# Patient Record
Sex: FEMALE | URBAN - METROPOLITAN AREA
[De-identification: names, ages, dates, MRNs, and addresses within clinical notes are randomized per-mention and may not be internally consistent; named-entity substitution may affect disease eponyms.]

---

## 2018-07-25 ENCOUNTER — HOSPITAL ENCOUNTER (OUTPATIENT)
Dept: LAB | Age: 83
Discharge: HOME | End: 2018-07-25

## 2018-07-25 LAB
APPEARANCE UR: CLEAR
BACTERIA URNS QL MICRO: 0 /HPF
BILIRUB UR QL: NEGATIVE
CASTS URNS QL MICRO: ABNORMAL /LPF
COLOR UR: YELLOW
EPI CELLS #/AREA URNS HPF: ABNORMAL /HPF
GLUCOSE UR STRIP.AUTO-MCNC: NEGATIVE MG/DL
HGB UR QL STRIP: NEGATIVE
KETONES UR QL STRIP.AUTO: NEGATIVE MG/DL
LEUKOCYTE ESTERASE UR QL STRIP.AUTO: ABNORMAL
NITRITE UR QL STRIP.AUTO: NEGATIVE
PH UR STRIP: 6 [PH] (ref 5–9)
PROT UR STRIP-MCNC: 30 MG/DL
RBC #/AREA URNS HPF: 0 /HPF
SP GR UR REFRACTOMETRY: 1.02 (ref 1–1.02)
UROBILINOGEN UR QL STRIP.AUTO: 1 EU/DL (ref 0.2–1)
WBC URNS QL MICRO: ABNORMAL /HPF

## 2018-07-25 PROCEDURE — 81001 URINALYSIS AUTO W/SCOPE: CPT | Performed by: NURSE PRACTITIONER

## 2020-08-06 ENCOUNTER — HOSPICE ADMISSION (OUTPATIENT)
Dept: HOSPICE | Facility: HOSPICE | Age: 85
End: 2020-08-06
Payer: OTHER MISCELLANEOUS

## 2020-08-15 ENCOUNTER — HOSPITAL ENCOUNTER (INPATIENT)
Age: 85
LOS: 5 days | Discharge: HOME | End: 2020-08-20
Attending: INTERNAL MEDICINE | Admitting: INTERNAL MEDICINE

## 2020-08-15 PROBLEM — I10 HYPERTENSION: Status: ACTIVE | Noted: 2019-03-18

## 2020-08-15 PROBLEM — R13.19 ESOPHAGEAL DYSPHAGIA: Status: ACTIVE | Noted: 2020-03-18

## 2020-08-15 PROBLEM — R53.81 DEBILITY: Status: ACTIVE | Noted: 2019-05-09

## 2020-08-15 PROBLEM — K80.20 CHOLELITHIASIS: Status: ACTIVE | Noted: 2019-03-18

## 2020-08-15 PROBLEM — R33.9 RETENTION OF URINE: Status: ACTIVE | Noted: 2019-05-10

## 2020-08-15 PROBLEM — N18.30 CKD (CHRONIC KIDNEY DISEASE) STAGE 3, GFR 30-59 ML/MIN (HCC): Status: ACTIVE | Noted: 2018-10-18

## 2020-08-15 PROBLEM — R29.6 REPEATED FALLS: Status: ACTIVE | Noted: 2020-08-15

## 2020-08-15 PROBLEM — Z95.2 S/P TAVR (TRANSCATHETER AORTIC VALVE REPLACEMENT): Status: ACTIVE | Noted: 2019-05-15

## 2020-08-15 PROBLEM — E43 SEVERE PROTEIN-CALORIE MALNUTRITION (HCC): Status: ACTIVE | Noted: 2020-08-15

## 2020-08-15 PROBLEM — M81.0 SENILE OSTEOPOROSIS: Status: ACTIVE | Noted: 2019-02-05

## 2020-08-15 PROBLEM — R63.4 ABNORMAL WEIGHT LOSS: Status: ACTIVE | Noted: 2020-08-15

## 2020-08-15 PROBLEM — I25.10 ATHEROSCLEROSIS OF NATIVE CORONARY ARTERY WITHOUT ANGINA PECTORIS: Status: ACTIVE | Noted: 2020-08-15

## 2020-08-15 PROBLEM — I50.22 CHRONIC SYSTOLIC HEART FAILURE (HCC): Status: ACTIVE | Noted: 2019-04-12

## 2020-08-15 PROBLEM — I27.20 PULMONARY HTN (HCC): Status: ACTIVE | Noted: 2020-08-15

## 2020-08-15 PROBLEM — S52.502A CLOSED FRACTURE OF LEFT DISTAL RADIUS: Status: ACTIVE | Noted: 2019-11-08

## 2020-08-15 PROBLEM — S00.03XA: Status: ACTIVE | Noted: 2020-06-18

## 2020-08-15 PROBLEM — I35.0 NONRHEUMATIC AORTIC VALVE STENOSIS: Status: ACTIVE | Noted: 2019-03-18

## 2020-08-15 PROBLEM — R63.0 ANOREXIA: Status: ACTIVE | Noted: 2020-08-15

## 2020-08-15 PROBLEM — R11.0 NAUSEA: Status: ACTIVE | Noted: 2019-05-09

## 2020-08-15 PROBLEM — E55.9 VITAMIN D DEFICIENCY: Status: ACTIVE | Noted: 2018-09-06

## 2020-08-15 PROBLEM — I13.0 HYPERTENSIVE HEART AND CHRONIC KIDNEY DISEASE WITH HEART FAILURE (HCC): Status: ACTIVE | Noted: 2020-08-15

## 2020-08-15 PROBLEM — S06.5XAA SUBDURAL HEMATOMA: Status: ACTIVE | Noted: 2018-07-05

## 2020-08-15 PROBLEM — B37.81 CANDIDA ESOPHAGITIS (HCC): Status: ACTIVE | Noted: 2020-08-15

## 2020-08-15 PROBLEM — G89.29 CHRONIC PAIN: Status: ACTIVE | Noted: 2020-08-15

## 2020-08-15 PROBLEM — E43 PROTEIN-CALORIE MALNUTRITION, SEVERE (HCC): Status: ACTIVE | Noted: 2020-08-15

## 2020-08-15 PROBLEM — S32.509A FRACTURE OF PUBIS (HCC): Status: ACTIVE | Noted: 2018-07-05

## 2020-08-15 PROBLEM — S66.812A: Status: ACTIVE | Noted: 2019-12-03

## 2020-08-15 PROBLEM — D64.9 ANEMIA: Status: ACTIVE | Noted: 2018-09-06

## 2020-08-15 PROBLEM — N18.30 CKD (CHRONIC KIDNEY DISEASE) STAGE 3, GFR 30-59 ML/MIN (HCC): Status: ACTIVE | Noted: 2020-08-15

## 2020-08-15 PROBLEM — U07.1 COVID-19: Status: ACTIVE | Noted: 2020-06-23

## 2020-08-15 PROBLEM — K22.711 BARRETT'S ESOPHAGUS WITH HIGH GRADE DYSPLASIA: Status: ACTIVE | Noted: 2020-08-15

## 2020-08-15 PROCEDURE — 3336500001 HSPC ELECTION

## 2020-08-15 PROCEDURE — G0156 HHCP-SVS OF AIDE,EA 15 MIN: HCPCS

## 2020-08-15 PROCEDURE — 0655 HSPC INPATIENT RESPITE

## 2020-08-15 PROCEDURE — G0299 HHS/HOSPICE OF RN EA 15 MIN: HCPCS

## 2020-08-15 PROCEDURE — 74011250637 HC RX REV CODE- 250/637: Performed by: NURSE PRACTITIONER

## 2020-08-15 RX ORDER — CETIRIZINE HCL 10 MG
10 TABLET ORAL DAILY
Status: DISCONTINUED | OUTPATIENT
Start: 2020-08-16 | End: 2020-08-20 | Stop reason: HOSPADM

## 2020-08-15 RX ORDER — DOCUSATE SODIUM 100 MG/1
100 CAPSULE, LIQUID FILLED ORAL
COMMUNITY
Start: 2020-06-26

## 2020-08-15 RX ORDER — DOCUSATE SODIUM 100 MG/1
100 CAPSULE, LIQUID FILLED ORAL
Status: DISCONTINUED | OUTPATIENT
Start: 2020-08-15 | End: 2020-08-20 | Stop reason: HOSPADM

## 2020-08-15 RX ORDER — SUCRALFATE 1 G/1
1 TABLET ORAL 3 TIMES DAILY
Status: DISCONTINUED | OUTPATIENT
Start: 2020-08-15 | End: 2020-08-15

## 2020-08-15 RX ORDER — LANOLIN ALCOHOL/MO/W.PET/CERES
3 CREAM (GRAM) TOPICAL
COMMUNITY
Start: 2020-06-26

## 2020-08-15 RX ORDER — LANOLIN ALCOHOL/MO/W.PET/CERES
3 CREAM (GRAM) TOPICAL
Status: DISCONTINUED | OUTPATIENT
Start: 2020-08-15 | End: 2020-08-20 | Stop reason: HOSPADM

## 2020-08-15 RX ORDER — SUCRALFATE 1 G/1
1 TABLET ORAL 3 TIMES DAILY
COMMUNITY

## 2020-08-15 RX ORDER — CETIRIZINE HCL 10 MG
10 TABLET ORAL DAILY
COMMUNITY

## 2020-08-15 RX ORDER — ACETAMINOPHEN 500 MG/1
500 CAPSULE, LIQUID FILLED ORAL
Status: DISCONTINUED | OUTPATIENT
Start: 2020-08-15 | End: 2020-08-20 | Stop reason: HOSPADM

## 2020-08-15 RX ORDER — HYDROGEN PEROXIDE 3 %
20 SOLUTION, NON-ORAL MISCELLANEOUS 2 TIMES DAILY
COMMUNITY

## 2020-08-15 RX ORDER — HYDROCODONE BITARTRATE AND ACETAMINOPHEN 5; 325 MG/1; MG/1
1 TABLET ORAL
Status: DISCONTINUED | OUTPATIENT
Start: 2020-08-15 | End: 2020-08-20 | Stop reason: HOSPADM

## 2020-08-15 RX ORDER — SUCRALFATE 1 G/1
1 TABLET ORAL
Status: DISCONTINUED | OUTPATIENT
Start: 2020-08-16 | End: 2020-08-17

## 2020-08-15 RX ORDER — HYDROGEN PEROXIDE 3 %
20 SOLUTION, NON-ORAL MISCELLANEOUS 2 TIMES DAILY
Status: DISCONTINUED | OUTPATIENT
Start: 2020-08-15 | End: 2020-08-17

## 2020-08-15 RX ADMIN — Medication 3 MG: at 21:17

## 2020-08-15 RX ADMIN — HYDROCODONE BITARTRATE AND ACETAMINOPHEN 1 TABLET: 5; 325 TABLET ORAL at 21:17

## 2020-08-15 RX ADMIN — Medication 100 MG: at 21:17

## 2020-08-15 RX ADMIN — ESOMEPRAZOLE MAGNESIUM 20 MG: 20 CAPSULE, DELAYED RELEASE ORAL at 18:22

## 2020-08-15 NOTE — PROGRESS NOTES
Pt arrived via private car, transferred to room 106 via wheelchair, pt alert and oriented. Oriented her to room, use of call bell, temperature control, tab alerts and use of TV control. Pt answering questions appropriately. Physical assessment completed. Lung sounds clear but diminished, heart sounds regular, hypoactive bowel sounds, extremities cool with palpable pulses. Lower extremities flaky. Sacrum reddened unblanchable, heels reddened, and non blanchable. Pt appears dyspneic, states she's a little short of breath, states she has back pain but she did not bring tylenol po because she states it does not work. [phone] called [facility] RN about pain medication needs and order for eye drops. 1640 with stand by assist, pt ambulated into bathroom with use of walker. Pt voided and ambulated back to bed without problem     1702 [facility] RN Harris Ramirez at bedside. Discussed medication order needs. Medication orders faxed to [facility].     1745 pt was assisted up to bathroom but states she was unable to pull the cord, pt ambulated to bed. Assisted her back to bed.     1829 administered evening medications. Pt refused carafate states she needs to take it before meals. Asking about when she will be taking her sleeping pills. Discussed when she would like to take her sleep aides.     1848 received Norco 5/325 counted and locked in double locks with on coming nurse Nathaniel Tracey RN    Report given to Nathaniel Tracey RN

## 2020-08-15 NOTE — PROGRESS NOTES
Problem: Risk for Falls  Goal: Free of falls during episode of care  Description: Patient will be free of falls during episode of care.   Outcome: Progressing Towards Goal     Problem: Pain  Goal: Verbalize satisfaction of level of comfort and symptom control  Outcome: Progressing Towards Goal

## 2020-08-16 PROCEDURE — G0299 HHS/HOSPICE OF RN EA 15 MIN: HCPCS

## 2020-08-16 PROCEDURE — 0655 HSPC INPATIENT RESPITE

## 2020-08-16 PROCEDURE — 74011250637 HC RX REV CODE- 250/637: Performed by: NURSE PRACTITIONER

## 2020-08-16 PROCEDURE — G0156 HHCP-SVS OF AIDE,EA 15 MIN: HCPCS

## 2020-08-16 RX ORDER — HYDROCODONE BITARTRATE AND ACETAMINOPHEN 5; 325 MG/1; MG/1
1 TABLET ORAL
COMMUNITY

## 2020-08-16 RX ORDER — CARBOXYMETHYLCELLULOSE SODIUM 10 MG/ML
2 GEL OPHTHALMIC
Status: DISCONTINUED | OUTPATIENT
Start: 2020-08-16 | End: 2020-08-20 | Stop reason: HOSPADM

## 2020-08-16 RX ADMIN — Medication 3 MG: at 21:12

## 2020-08-16 RX ADMIN — HYDROCODONE BITARTRATE AND ACETAMINOPHEN 1 TABLET: 5; 325 TABLET ORAL at 08:54

## 2020-08-16 RX ADMIN — SUCRALFATE 1 G: 1 TABLET ORAL at 15:41

## 2020-08-16 RX ADMIN — SUCRALFATE 1 G: 1 TABLET ORAL at 10:51

## 2020-08-16 RX ADMIN — ESOMEPRAZOLE MAGNESIUM 20 MG: 20 CAPSULE, DELAYED RELEASE ORAL at 17:14

## 2020-08-16 RX ADMIN — HYDROCODONE BITARTRATE AND ACETAMINOPHEN 1 TABLET: 5; 325 TABLET ORAL at 18:12

## 2020-08-16 RX ADMIN — Medication 10 MG: at 08:27

## 2020-08-16 RX ADMIN — Medication 100 MG: at 21:12

## 2020-08-16 RX ADMIN — ESOMEPRAZOLE MAGNESIUM 20 MG: 20 CAPSULE, DELAYED RELEASE ORAL at 07:42

## 2020-08-16 NOTE — PROGRESS NOTES
184  Received report from [facility], RN. Pt Id by name and . 1907  Pt awake lying in bed watching TV. A&O x 3. Denies pain at this time. Flacc =0-1. Resp non labored on RA. Lungs diminished. HR reg. BS hypo. Pt up to BR with assist. SR up x 2. Bed low/locked. Call light with in reach. Door opened. 211  Pt awake. Assisted pt up to BR. Pt voided. Assisted back to bed. Pt denies pain at this time. Flacc =0-1. Scheduled PO medications  given. Tolerated well. Resp non labored on RA. SR up x 2. Bed low/locked. Call light with in reach. Door opened. 2300  Pt resting comfortably in bed. No s/sx of distress. Flacc =0-1. Resp non labored on RA. SR up x 2. Bed low/locked. Call light with in reach. Door opened. 0140  Pt awake. C/O back pain 7/10. Hydrocodone 5/325 mg PO given. Pt repositioned in bed by CNA after returning back from BR. Resp non labored on RA. SR up x 2. Bed low/locked. Call light with in reach. Door opened. 0300   Pt sleeping. No s/sx of distress. Flacc =0-1. Resp non labored on RA. SR up x 2. Bed low/locked. Call light with in reach. Door opened. 0615  Pt sleeping. No s/sx of distress. Flacc =0-1. Resp non labored on RA. SR up x 2. Bed low/locked. Call light with in reach. Door opened. Report given to Joselyn Campo RN.

## 2020-08-16 NOTE — PROGRESS NOTES
Report received from off-going nurse,Jo Ghosh RN  visual identification made, assumed care of pt. Pt resting quietly with eyes closed, no agitation or restlessness, no grimacing or groaning. Pt respirations unlabored. Tab alert in place, rails up x 2, bed in lowest position, safety maintained. FLACC 0.    9509 assisted up to bathroom, pt ambulated with use of walker. Pt states she needs to take carafate an hour before meals so she refused it this morning, set pt up sitting in chair for breakfast, administered nexium per pt request.     0825 pt ate 50% of her breakfast, took her cetirizine without difficulty. Assisted to bathroom with pt using walker with a steady gait. Pt voided clear yellow urine    0850 assisted pt to bed with use of walker, pt states her back pain is 8/10    0857 administered norco for back pain 8/10. Call bell in reach, as well as items on side table. 0930 pt states she doesn't hurt as long as she is still. 1055 administered scheduled carafate, so it is administered an hour before her lunch. 1118 assisted pt up to bathroom. Pt ambulated with use of walker, states she had a bowel movement. Ambulated to the bench and placed doughnut for her to sit on and pillows behind her back. Gave her tab alert to pull when she needs assistance    1224 pt ate 75% of her dinner, denies any needs at this time    1242 assisted pt to her bed,     1356 pt resting quietly, no grimacing, groaning or agitation     1534 pt resting quietly, no grimacing, groaning or agitation. Watching TV    1549 administered carafate before evening meal. Pt states she normally takes nexium with carafate. She states she eats better without the carafate    1635 assisted pt up to bathroom, pt ambulated with use of walker, voided and states she had a bowel movement. 1700 assisted pt out of bed and ambulated to bench seat and set up her dinner to eat    1814 assisted pt to bathroom, she ambulated using the walker.  Pt voided. Assisted pt back to bed. She stated her tailbone pain is 8/10.  Administered [place] for pain     Report given to Santana Anaya RN

## 2020-08-16 NOTE — PROGRESS NOTES
Walking rounds completed with off going RN. Pt identified by name/. Watching TV. Smiling and social on approach. Denies needs at this time. Denies pain. Bed in low and locked position with side rails up x 2 with call light in reach. Door left open as pt is unaccompanied.  Assisted to bathroom with cna. Tolerated well with use of walker. Voiding without difficulty. C/o lower back pain 8/10 upon returning to bed. Reports chronic pain to back. Medicated with Lortab 5/325mg one tablet as ordered. Tolerated HS medications whole with no problems. Emotional support provided. Bed in low and locked position with side rails up x 2 and call light in reach. Door left open as pt is unaccompanied. 2347 Lauzon Tyndall soundly with eyes closed. Respirations non labored. Flacc 0. All safety measures continue. 0030 Continues to sleep peacefully with eyes closed. Respirations easy and unlabored. Facial features flat,relaxed. No s/sx of pain,anxiety,nausea or distress. All safety measures continue. Door remains open. 0226 Awakened to toilet. CNA assits to bathroom to void. Requires standby assist only. Gait is steady with use of walker. Denies any pain,anxiety,nausea at this time. Returned to bed and agrees to call for further needs. 1235  Sleeping soundly with eyes closed. Respirations non labored. Flacc 0. All safety measures continue. Pt has rested well this night.      Report to [facility]

## 2020-08-17 PROCEDURE — 74011250637 HC RX REV CODE- 250/637: Performed by: NURSE PRACTITIONER

## 2020-08-17 PROCEDURE — G0299 HHS/HOSPICE OF RN EA 15 MIN: HCPCS

## 2020-08-17 PROCEDURE — 0655 HSPC INPATIENT RESPITE

## 2020-08-17 RX ORDER — HYDROGEN PEROXIDE 3 %
20 SOLUTION, NON-ORAL MISCELLANEOUS 2 TIMES DAILY
Status: DISCONTINUED | OUTPATIENT
Start: 2020-08-18 | End: 2020-08-20 | Stop reason: HOSPADM

## 2020-08-17 RX ORDER — SUCRALFATE 1 G/1
1 TABLET ORAL
Status: DISCONTINUED | OUTPATIENT
Start: 2020-08-17 | End: 2020-08-20 | Stop reason: HOSPADM

## 2020-08-17 RX ADMIN — SUCRALFATE 1 G: 1 TABLET ORAL at 07:15

## 2020-08-17 RX ADMIN — HYDROCODONE BITARTRATE AND ACETAMINOPHEN 1 TABLET: 5; 325 TABLET ORAL at 01:54

## 2020-08-17 RX ADMIN — HYDROCODONE BITARTRATE AND ACETAMINOPHEN 1 TABLET: 5; 325 TABLET ORAL at 19:21

## 2020-08-17 RX ADMIN — HYDROCODONE BITARTRATE AND ACETAMINOPHEN 1 TABLET: 5; 325 TABLET ORAL at 12:59

## 2020-08-17 RX ADMIN — Medication 10 MG: at 08:31

## 2020-08-17 RX ADMIN — SUCRALFATE 1 G: 1 TABLET ORAL at 16:06

## 2020-08-17 RX ADMIN — Medication 3 MG: at 21:43

## 2020-08-17 RX ADMIN — Medication 100 MG: at 21:42

## 2020-08-17 RX ADMIN — ESOMEPRAZOLE MAGNESIUM 20 MG: 20 CAPSULE, DELAYED RELEASE ORAL at 08:31

## 2020-08-17 NOTE — PROGRESS NOTES
Problem: Potential for Skin Breakdown  Goal: Demonstrate ability to care for skin, monitor areas of breakdown and demonstrate methods to prevent breakdown  Description: Patient/family/caregiver will demonstrate ability to care for patient's skin, monitor for areas of breakdown, and demonstrate methods to prevent breakdown during hospice care. Outcome: Progressing Towards Goal  Goal: *Absence of skin breakdown  Description: Instruct pt to shift weight, use pillows to off set weight on sacrum. Apply moisture barrier cream   Outcome: Progressing Towards Goal     Problem: Risk for Falls  Goal: Free of falls during episode of care  Description: Patient will be free of falls during episode of care. Outcome: Progressing Towards Goal     Problem: Pain  Goal: Verbalize satisfaction of level of comfort and symptom control  Outcome: Progressing Towards Goal     Problem: Falls - Risk of  Goal: *Absence of Falls  Description: Document Hanna Fall Risk and appropriate interventions in the flowsheet.   Outcome: Progressing Towards Goal  Note: Fall Risk Interventions:  Mobility Interventions: Bed/chair exit alarm, Communicate number of staff needed for ambulation/transfer, Patient to call before getting OOB, Utilize walker, cane, or other assistive device         Medication Interventions: Bed/chair exit alarm, Evaluate medications/consider consulting pharmacy    Elimination Interventions: Bed/chair exit alarm, Call light in reach, Patient to call for help with toileting needs

## 2020-08-17 NOTE — PROGRESS NOTES
0102- The patient report and walking rounds completed with Trish Kohli RN. The patient is awake and alert to person, place and time. The patient identified self by name and . Patient states that she is not in pain, not SOB, has no nausea and is not anxious. She is in the bathroom with the CNA.     -  patient requested, \"the pill that I take an hour before eating. \" Sucralfate given broken in half. The patient states that she always waits and hour before she eats. 0730- The patient called and the CNA assisted her to sit in a chair at the bedside to eat breakfast. The patient has the call light within her reach and calls with her needs. 0830- AM scheduled medications administered whole as ordered. 1248- The patient complained of pain in the coccyx area of 8 on a 0/10 scale. The patient was given Norco 5-325 po for pain. The patient is in the bed now and remains alert and oriented times 3.     1313- The patient states that the pain is now 0.    1600- The patient took her Carafate and requested the Nexxium to be given at the same time. JOLENE Julian will change the order for her. 1800- The patient remains alert and oriented times three and calls with her needs. Report given to Stella Carpenter RN.

## 2020-08-17 NOTE — PROGRESS NOTES
Problem: Potential for Skin Breakdown  Goal: Demonstrate ability to care for skin, monitor areas of breakdown and demonstrate methods to prevent breakdown  Description: Patient/family/caregiver will demonstrate ability to care for patient's skin, monitor for areas of breakdown, and demonstrate methods to prevent breakdown during hospice care. Outcome: Progressing Towards Goal     Problem: Risk for Falls  Goal: Free of falls during episode of care  Description: Patient will be free of falls during episode of care. Outcome: Progressing Towards Goal     Problem: Pain  Goal: Verbalize satisfaction of level of comfort and symptom control  Outcome: Progressing Towards Goal     Problem: Falls - Risk of  Goal: *Absence of Falls  Description: Document Hanna Fall Risk and appropriate interventions in the flowsheet.   Outcome: Progressing Towards Goal  Note: Fall Risk Interventions:  Mobility Interventions: Bed/chair exit alarm         Medication Interventions: Bed/chair exit alarm    Elimination Interventions: Bed/chair exit alarm, Call light in reach

## 2020-08-17 NOTE — [facility] IDG CHAPLAIN NOTES
Patient: Maddy Carmona    Date: 08/17/20  Time: 9:29 AM    [facility]  Notes    Maddy Carmona is an [facility] her for Respite Care. Her  will provide spiritual care. [facility] available for support as needed, referred or requested.          Signed by: Zuly Huber 49

## 2020-08-18 PROCEDURE — 74011250637 HC RX REV CODE- 250/637: Performed by: NURSE PRACTITIONER

## 2020-08-18 PROCEDURE — 0655 HSPC INPATIENT RESPITE

## 2020-08-18 PROCEDURE — G0156 HHCP-SVS OF AIDE,EA 15 MIN: HCPCS

## 2020-08-18 RX ADMIN — SUCRALFATE 1 G: 1 TABLET ORAL at 16:14

## 2020-08-18 RX ADMIN — ESOMEPRAZOLE MAGNESIUM 20 MG: 20 CAPSULE, DELAYED RELEASE ORAL at 05:47

## 2020-08-18 RX ADMIN — HYDROCODONE BITARTRATE AND ACETAMINOPHEN 1 TABLET: 5; 325 TABLET ORAL at 08:54

## 2020-08-18 RX ADMIN — Medication 3 MG: at 21:18

## 2020-08-18 RX ADMIN — HYDROCODONE BITARTRATE AND ACETAMINOPHEN 1 TABLET: 5; 325 TABLET ORAL at 15:04

## 2020-08-18 RX ADMIN — HYDROCODONE BITARTRATE AND ACETAMINOPHEN 1 TABLET: 5; 325 TABLET ORAL at 02:54

## 2020-08-18 RX ADMIN — ESOMEPRAZOLE MAGNESIUM 20 MG: 20 CAPSULE, DELAYED RELEASE ORAL at 16:14

## 2020-08-18 RX ADMIN — SUCRALFATE 1 G: 1 TABLET ORAL at 05:47

## 2020-08-18 RX ADMIN — HYDROCODONE BITARTRATE AND ACETAMINOPHEN 1 TABLET: 5; 325 TABLET ORAL at 21:18

## 2020-08-18 RX ADMIN — SUCRALFATE 1 G: 1 TABLET ORAL at 11:09

## 2020-08-18 RX ADMIN — Medication 100 MG: at 21:18

## 2020-08-18 RX ADMIN — Medication 10 MG: at 08:54

## 2020-08-18 NOTE — PROGRESS NOTES
5650- The patient shift change rounds and report taken from Jeaneth Butts RN. The patient was identified by name and . Patient is from [facility] in for a Respite stay. Patient is awake and states that she is not in pain, has no nausea and no anxiety or SOB. She is on room air. The bed remains low and locked with two bed rails up and a tab alert in place. The patient has the call light within her reach and she calls with her needs. The patient's room is directly across from the nurses station and the door to the room is left open for close observation. 3388- The patient is up in the chair at her bedside eating her breakfast. She states that her back did real good last night but is a 6 on the pain scale level now. Medicated with her Norco for pain and also her scheduled Zyrtec. Patient took the pills whole with water. Patient ate almost all of her breakfast and drank two cups of coffee. Patient is alert and oriented to person, place and time. 0930- Patient states that her back pain is now at 0. She is in the bed and has the call light within her reach. 1109- Patient was administered her lunchtime Carafate. Patient took with no problems. Patient remains alert and oriented times three and continues to deny any distress. 1330- The patient is now in the bed watching television and continues to deny any distress. 1505- Patient complains of back pain 5/10 aching around coccyx area. Medicated with [place] po from her home medications. Patient denies other distress. Patient had continent       1605- Scheduled Carafate and Nexium administered. Patient was assisted to the bathroom to void. Gait is steady with walker and stand by assist. Patient states that her back pain is now 0. Patient remains alert and oriented times 3. Patient was left sitting in a chair at the bedside. She has the call light within her reach. 1645- The patient request to sit on the bench seat.  She states that the chair is causing her legs to go to sleep. Assisted her to the bench seat and instructed her to call with her needs. Patient does not attempt to get up alone.      Report off to Haylie Kate RN

## 2020-08-18 NOTE — PROGRESS NOTES
184:  Patient report from Rody Cao RN. Patient ID by name and . Pt in bed watching TV. Denies pain or any needs. FLACC 0/10. Bed low and locked and all safety measures in place. Door open. :  Patient called for pain pill. States she is supposed to get one at 7 PM.  Asked pt if she was in pain and she stated yes. Rated 5/10. Medicated with PRN [place] as ordered. Will reassess. :  Scheduled HS medications given at this time. States pain is better. 5684:  Assist pt to bathroom. 12:  CNA assisted pt to bathroom. Pt requesting pain pill. Rates pain 6/10. PRN Norco given as ordered. Will reassess. 0400:  Reassessment:  Pt resting with eyes closed. No s/sx of pain observed. FLACC 0/10.    0547:  Scheduled AM medications given as ordered. Patient has required Norco x 2 for pain this shift.     Report to Rody Cao RN

## 2020-08-18 NOTE — PROGRESS NOTES
Problem: Potential for Skin Breakdown  Goal: Demonstrate ability to care for skin, monitor areas of breakdown and demonstrate methods to prevent breakdown  Description: Patient/family/caregiver will demonstrate ability to care for patient's skin, monitor for areas of breakdown, and demonstrate methods to prevent breakdown during hospice care. Outcome: Progressing Towards Goal     Problem: Pain  Goal: Verbalize satisfaction of level of comfort and symptom control  Outcome: Progressing Towards Goal     Problem: Falls - Risk of  Goal: *Absence of Falls  Description: Document Hanna Fall Risk and appropriate interventions in the flowsheet. Outcome: Progressing Towards Goal  Note: Fall Risk Interventions:  Mobility Interventions: Bed/chair exit alarm         Medication Interventions: Bed/chair exit alarm    Elimination Interventions: Bed/chair exit alarm, Call light in reach              Problem: Pressure Injury - Risk of  Goal: *Prevention of pressure injury  Description: Document Luis Scale and appropriate interventions in the flowsheet.   Outcome: Progressing Towards Goal  Note: Pressure Injury Interventions:       Moisture Interventions: Minimize layers, Offer toileting Q_hr    Activity Interventions: Pressure redistribution bed/mattress(bed type)    Mobility Interventions: Pressure redistribution bed/mattress (bed type)    Nutrition Interventions: Document food/fluid/supplement intake    Friction and Shear Interventions: Apply protective barrier, creams and emollients, Minimize layers

## 2020-08-18 NOTE — PROGRESS NOTES
Problem: Potential for Skin Breakdown  Goal: Demonstrate ability to care for skin, monitor areas of breakdown and demonstrate methods to prevent breakdown  Description: Patient/family/caregiver will demonstrate ability to care for patient's skin, monitor for areas of breakdown, and demonstrate methods to prevent breakdown during hospice care. Outcome: Progressing Towards Goal     Problem: Risk for Falls  Goal: Free of falls during episode of care  Description: Patient will be free of falls during episode of care. Outcome: Progressing Towards Goal     Problem: Pain  Goal: Verbalize satisfaction of level of comfort and symptom control  Outcome: Progressing Towards Goal  Note: Patient was medicated with Norco times two this shift for back pain. Medications resolved the pain.

## 2020-08-18 NOTE — DISCHARGE SUMMARY
Discharge Summary     Patient: [facility] MRN: [id number]  SSN: [id number]    YOB: 1932  Age: 80 y.o.   Sex: female       Admit Date: 8/15/2020    Discharge Date: 8/20/2020 at 0830    Admission Diagnoses: Protein-calorie malnutrition, severe ([place] [address] [E43]    Discharge Diagnoses:   Problem List as of 8/18/2020 Date Reviewed: 8/15/2020          Codes Class Noted - Resolved    * (Principal) Severe protein-calorie malnutrition ([place] [address].) ICD-10-CM: E43  ICD-9-CM: 262  8/15/2020 - Present        Anorexia ICD-10-CM: R63.0  ICD-9-CM: 783.0  8/15/2020 - Present        Hernandez's esophagus with high grade dysplasia ICD-10-CM: K22.711  ICD-9-CM: 530.85  8/15/2020 - Present        Candida esophagitis ([place] [address].) ICD-10-CM: B37.81  ICD-9-CM: 112.84  8/15/2020 - Present        Atherosclerosis of native coronary artery without angina pectoris ICD-10-CM: I25.10  ICD-9-CM: 414.01  8/15/2020 - Present        Pulmonary HTN ([place] [address].) ICD-10-CM: I27.20  ICD-9-CM: 416.8  8/15/2020 - Present        Hypertensive heart and chronic kidney disease with heart failure ([place] [address].) ICD-10-CM: I13.0  ICD-9-CM: 404.91, 428.9, 585.9  8/15/2020 - Present        CKD (chronic kidney disease) stage 3, GFR 30-59 ml/min ([facility]) ICD-10-CM: N18.3  ICD-9-CM: 585.3  8/15/2020 - Present        Repeated falls ICD-10-CM: R29.6  ICD-9-CM: 781.99  8/15/2020 - Present        BMI less than 19,adult ICD-10-CM: Z68.1  ICD-9-CM: V85.0  8/15/2020 - Present        Abnormal weight loss ICD-10-CM: R63.4  ICD-9-CM: 783.21  8/15/2020 - Present        Chronic pain ICD-10-CM: G89.29  ICD-9-CM: 338.29  8/15/2020 - Present        Protein-calorie malnutrition, severe ([facility] ICD-10-CM: E43  ICD-9-CM: 262  8/15/2020 - Present        Esophageal dysphagia ICD-10-CM: R13.10  ICD-9-CM: 787.20  3/18/2020 - Present            Debility ICD-10-CM: R53.81  ICD-9-CM: 799.3  5/9/2019 - Present            Chronic systolic heart failure ([facility].) ICD-10-CM: I50.22  ICD-9-CM: 428.22  4/12/2019 - Present Hypertension ICD-10-CM: I10  ICD-9-CM: 401.9  3/18/2019 - Present        Senile osteoporosis ICD-10-CM: M81.0  ICD-9-CM: 733.01  2/5/2019 - Present        Anemia ICD-10-CM: D64.9  ICD-9-CM: 285.9  9/6/2018 - Present                   Discharge Condition: TEE Kevin Course: Discharge from respite stay at [facility] to home with [facility]. DC 8/20/2020 at 0830 via ambulance. Status at time of discharge is routine. Consults: None    Significant Diagnostic Studies: None    Disposition: home with [facility]    Discharge Medications:   Current Discharge Medication List      CONTINUE these medications which have NOT CHANGED    Details   HYDROcodone-acetaminophen (Norco) 5-325 mg per tablet Take 1 Tab by mouth every six (6) hours as needed for Pain. carboxymethylcellulose sodium (REFRESH OP) Administer 2 Drops to both eyes every four (4) hours as needed for Other (eye irritation). docusate sodium (COLACE) 100 mg capsule Take 100 mg by mouth nightly. melatonin 3 mg tablet Take 3 mg by mouth nightly. sucralfate (Carafate) 1 gram tablet Take 1 g by mouth three (3) times daily. cetirizine (ZYRTEC) 10 mg tablet Take 10 mg by mouth daily. esomeprazole (NEXIUM) 20 mg capsule Take 20 mg by mouth two (2) times a day. Activity: Activity as tolerated with assist  Diet: Resume previous diet  Wound Care: Stage 1 area to sacrum, apply barrier cream bid and prn. Oxygen: Room air  Equipment: Equipment as ordered by [facility]. Follow-up Appointments   Procedures    FOLLOW UP VISIT Appointment in: Other (Specify) Follow-up with home hospice per agency protocol. Follow-up with home hospice per agency protocol. Standing Status:   Standing     Number of Occurrences:   1     Order Specific Question:   Appointment in     Answer:    Other (Specify)       Signed By: Nata Gutierrez NP     August 18, 2020

## 2020-08-18 NOTE — PROGRESS NOTES
184:  Patient report from Mateo Castro RN. Patient ID by name and . Patient being helped back from bathroom with assist from [facility], [address] [place] [address]. Patient denies pain or needs at this time. Bed low and locked and all safety measures in place. Door open and call bell in reach. :  Scheduled HS medications given as ordered. Patient requested pain medicine. States her back hurts and rates pain 6/10. Medicated with PRN [place] as ordered. Will reassess. :  Reassessment:  Pt sleeping. No s/sx of pain observed. FLACC 0.    0130:  Pt sleeping. No s/sx of pain, dyspnea, or agitation observed. FLACC 0/10.    0400:  Pt sleeping. No s/sx of pain, dyspnea, or agitation observed. FLACC 010.    8854:  Assisted pt to bathroom to void. Scheduled AM medications given as ordered. Patient has required Norco x 1 this shift for pain. Report to Rosanna Bernard RN.

## 2020-08-19 PROBLEM — Z51.5 HOSPICE CARE PATIENT: Status: ACTIVE | Noted: 2020-08-19

## 2020-08-19 PROCEDURE — 0655 HSPC INPATIENT RESPITE

## 2020-08-19 PROCEDURE — 74011250637 HC RX REV CODE- 250/637: Performed by: NURSE PRACTITIONER

## 2020-08-19 PROCEDURE — G0299 HHS/HOSPICE OF RN EA 15 MIN: HCPCS

## 2020-08-19 PROCEDURE — G0156 HHCP-SVS OF AIDE,EA 15 MIN: HCPCS

## 2020-08-19 RX ADMIN — SUCRALFATE 1 G: 1 TABLET ORAL at 16:23

## 2020-08-19 RX ADMIN — ESOMEPRAZOLE MAGNESIUM 20 MG: 20 CAPSULE, DELAYED RELEASE ORAL at 05:37

## 2020-08-19 RX ADMIN — Medication 10 MG: at 08:26

## 2020-08-19 RX ADMIN — HYDROCODONE BITARTRATE AND ACETAMINOPHEN 1 TABLET: 5; 325 TABLET ORAL at 21:12

## 2020-08-19 RX ADMIN — SUCRALFATE 1 G: 1 TABLET ORAL at 11:24

## 2020-08-19 RX ADMIN — HYDROCODONE BITARTRATE AND ACETAMINOPHEN 1 TABLET: 5; 325 TABLET ORAL at 14:45

## 2020-08-19 RX ADMIN — SUCRALFATE 1 G: 1 TABLET ORAL at 05:37

## 2020-08-19 RX ADMIN — ESOMEPRAZOLE MAGNESIUM 20 MG: 20 CAPSULE, DELAYED RELEASE ORAL at 16:23

## 2020-08-19 RX ADMIN — Medication 100 MG: at 21:52

## 2020-08-19 RX ADMIN — Medication 3 MG: at 21:53

## 2020-08-19 RX ADMIN — HYDROCODONE BITARTRATE AND ACETAMINOPHEN 1 TABLET: 5; 325 TABLET ORAL at 08:26

## 2020-08-19 NOTE — DISCHARGE INSTRUCTIONS
Activity: Activity as tolerated with assist  Diet: Resume previous diet  Wound Care: Stage 1 area to sacrum, apply barrier cream bid and prn.    Oxygen: Room air  Equipment: Equipment as ordered by [facility].

## 2020-08-19 NOTE — PROGRESS NOTES
Problem: Pain  Goal: Verbalize satisfaction of level of comfort and symptom control  Outcome: Progressing Towards Goal     Problem: Falls - Risk of  Goal: *Absence of Falls  Description: Document Kalia Coello Fall Risk and appropriate interventions in the flowsheet. Outcome: Progressing Towards Goal  Note: Fall Risk Interventions:  Mobility Interventions: Bed/chair exit alarm         Medication Interventions: Bed/chair exit alarm    Elimination Interventions: Bed/chair exit alarm, Call light in reach              Problem: Pressure Injury - Risk of  Goal: *Prevention of pressure injury  Description: Document Luis Scale and appropriate interventions in the flowsheet.   Outcome: Progressing Towards Goal  Note: Pressure Injury Interventions:       Moisture Interventions: Minimize layers    Activity Interventions: Pressure redistribution bed/mattress(bed type)    Mobility Interventions: Pressure redistribution bed/mattress (bed type)    Nutrition Interventions: Document food/fluid/supplement intake    Friction and Shear Interventions: Apply protective barrier, creams and emollients, HOB 30 degrees or less, Minimize layers

## 2020-08-19 NOTE — PROGRESS NOTES
Report received. Pt round. Pt identified by name. Pt up in BR with CNA assisting. No s/s of pain, agitation or dyspnea. 6732 Morning meds given and Norco for pain in back. Pt ate most of breakfast, states \"I'm full\". Settled in bed with hearing aids in and  TV on. Warm blanket given. Call bell within reach. No further needs at this time. Allen Alberto 28. for pain in back. Pt has been up to BR with assist multiple time during the day. No other needs at this time.      1629 Scheduled meds given and pt up to [place] 8310 Report given to oncoming RN

## 2020-08-19 NOTE — PROGRESS NOTES
Rounds    Ms. Muñoz appears to be sleeping this morning when  checked in. Her  from [facility] contacted [facility] yesterday to say she is coming to visit today.

## 2020-08-19 NOTE — PROGRESS NOTES
Problem: Potential for Skin Breakdown  Goal: Demonstrate ability to care for skin, monitor areas of breakdown and demonstrate methods to prevent breakdown  Description: Patient/family/caregiver will demonstrate ability to care for patient's skin, monitor for areas of breakdown, and demonstrate methods to prevent breakdown during hospice care. Outcome: Progressing Towards Goal     Problem: Risk for Falls  Goal: Free of falls during episode of care  Description: Patient will be free of falls during episode of care. Outcome: Progressing Towards Goal     Problem: Pain  Goal: Verbalize satisfaction of level of comfort and symptom control  Outcome: Progressing Towards Goal     Problem: Falls - Risk of  Goal: *Absence of Falls  Description: Document Hanna Fall Risk and appropriate interventions in the flowsheet. Outcome: Progressing Towards Goal  Note: Fall Risk Interventions:  Mobility Interventions: Bed/chair exit alarm, Utilize walker, cane, or other assistive device         Medication Interventions: Bed/chair exit alarm, Patient to call before getting OOB    Elimination Interventions: Bed/chair exit alarm, Call light in reach, Stay With Me (per policy), Patient to call for help with toileting needs              Problem: Pressure Injury - Risk of  Goal: *Prevention of pressure injury  Description: Document Luis Scale and appropriate interventions in the flowsheet.   Outcome: Progressing Towards Goal  Note: Pressure Injury Interventions:       Moisture Interventions: Minimize layers    Activity Interventions: Pressure redistribution bed/mattress(bed type)    Mobility Interventions: Pressure redistribution bed/mattress (bed type)    Nutrition Interventions: Document food/fluid/supplement intake    Friction and Shear Interventions: Apply protective barrier, creams and emollients, HOB 30 degrees or less, Minimize layers                Problem: Patient Education: Go to Patient Education Activity  Goal: Patient/Family Education  Outcome: Resolved/Met     Problem: Patient Education: Go to Patient Education Activity  Goal: Patient/Family Education  Outcome: Resolved/Met

## 2020-08-19 NOTE — PROGRESS NOTES
Gloria Cortez 82 report from Rosanna Bernard RN. Pt Id by name and . 1913  Pt sitting up in bed watching TV. A&O x 3. Denies pain at this time. No s/sx of distress. Resp non labored on RA. Lungs diminished. HR reg. BS hypo. No edema noted at this time. SR up x 2. Bed low/locked. Call light with in reach. Door opened.   Pt c/o back pain 510. Hydrocodone 5/325 tab given po. Pt tolerated well.   Scheduled PO medications given PO. Pt resting comfortably. Denies pain at this time. 2331  Pt sleeping. Calm. No s/sx of distress. Flacc =0-1. Resp non labored on RA. SR up x 2. Bed low/locked. Call light with in reach. Door opened. 0116  Pt resting comfortably in bed. No facial grimace. Flacc =0-1. Resp non labored on RA. SR up x 2. Bed low/locked. Call light with in reach. Door opened. 0334  Pt sleeping. Calm. No s/sx of distress. Flacc =0-1. Resp non labored on RA. SR up x 2. Bed low/locked. Call light with in reach. Door opened. 0534  Pt resting comfortably in bed. No facial grimace. Flacc =0-1. Resp non labored on RA. Scheduled PO medications given. Pt tolerated well. SR up x 2. Bed low/locked. Call light with in reach. Door opened.      Report given to Rosanna Bernard RN

## 2020-08-20 VITALS
HEART RATE: 83 BPM | SYSTOLIC BLOOD PRESSURE: 157 MMHG | RESPIRATION RATE: 20 BRPM | TEMPERATURE: 96.5 F | DIASTOLIC BLOOD PRESSURE: 81 MMHG

## 2020-08-20 PROCEDURE — G0299 HHS/HOSPICE OF RN EA 15 MIN: HCPCS

## 2020-08-20 PROCEDURE — 74011250637 HC RX REV CODE- 250/637: Performed by: NURSE PRACTITIONER

## 2020-08-20 PROCEDURE — 0651 HSPC ROUTINE HOME CARE

## 2020-08-20 PROCEDURE — G0156 HHCP-SVS OF AIDE,EA 15 MIN: HCPCS

## 2020-08-20 RX ADMIN — ESOMEPRAZOLE MAGNESIUM 20 MG: 20 CAPSULE, DELAYED RELEASE ORAL at 05:34

## 2020-08-20 RX ADMIN — SUCRALFATE 1 G: 1 TABLET ORAL at 05:35

## 2020-08-20 NOTE — PROGRESS NOTES
Report received. Pt round. Pt identified by name. Pt awakened for breakfast. Pt for d/c today to home. No s/s of pain, agitation or dyspnea. Bed low and SR up with tab alerts on.     3138 Discharged to home with caregiver, Mila Bone. Report called to Interim RN, Shaun Vazquez.

## 2020-08-20 NOTE — PROGRESS NOTES
Problem: Potential for Skin Breakdown  Goal: Demonstrate ability to care for skin, monitor areas of breakdown and demonstrate methods to prevent breakdown  Description: Patient/family/caregiver will demonstrate ability to care for patient's skin, monitor for areas of breakdown, and demonstrate methods to prevent breakdown during hospice care. Outcome: Progressing Towards Goal  Goal: *Absence of skin breakdown  Description: Instruct pt to shift weight, use pillows to off set weight on sacrum. Apply moisture barrier cream   Outcome: Progressing Towards Goal     Problem: Risk for Falls  Goal: Free of falls during episode of care  Description: Patient will be free of falls during episode of care. Outcome: Progressing Towards Goal     Problem: Pain  Goal: Verbalize satisfaction of level of comfort and symptom control  Description: Pt will be free of pain during stay at [facility] as evidenced by FLACC score of 2 or less  Medications:  Norco 5/325  Outcome: Progressing Towards Goal     Problem: Falls - Risk of  Goal: *Absence of Falls  Description: Document Deven Costello Fall Risk and appropriate interventions in the flowsheet. Outcome: Progressing Towards Goal  Note: Fall Risk Interventions:  Mobility Interventions: Bed/chair exit alarm, Communicate number of staff needed for ambulation/transfer, Patient to call before getting OOB         Medication Interventions: Bed/chair exit alarm, Evaluate medications/consider consulting pharmacy    Elimination Interventions: Bed/chair exit alarm, Call light in reach, Patient to call for help with toileting needs              Problem: Pressure Injury - Risk of  Goal: *Prevention of pressure injury  Description: Document Luis Scale and appropriate interventions in the flowsheet.   Outcome: Progressing Towards Goal  Note: Pressure Injury Interventions:  Sensory Interventions: Assess changes in LOC, Float heels, Minimize linen layers, Keep linens dry and wrinkle-free, Check visual cues for pain    Moisture Interventions: Absorbent underpads, Minimize layers, Moisture barrier, Limit adult briefs, Apply protective barrier, creams and emollients    Activity Interventions: Pressure redistribution bed/mattress(bed type)    Mobility Interventions: Float heels    Nutrition Interventions: Document food/fluid/supplement intake    Friction and Shear Interventions: Apply protective barrier, creams and emollients, Minimize layers

## 2020-08-21 PROCEDURE — 0651 HSPC ROUTINE HOME CARE

## 2020-08-22 PROCEDURE — 0651 HSPC ROUTINE HOME CARE

## 2020-08-23 PROCEDURE — 0651 HSPC ROUTINE HOME CARE

## 2020-08-24 PROCEDURE — 0651 HSPC ROUTINE HOME CARE

## 2020-08-25 PROCEDURE — 0651 HSPC ROUTINE HOME CARE

## 2020-08-26 PROCEDURE — 0651 HSPC ROUTINE HOME CARE

## 2020-08-27 PROCEDURE — 0651 HSPC ROUTINE HOME CARE

## 2020-08-28 PROCEDURE — 0651 HSPC ROUTINE HOME CARE

## 2020-08-29 PROCEDURE — 0651 HSPC ROUTINE HOME CARE

## 2020-08-30 PROCEDURE — 0651 HSPC ROUTINE HOME CARE

## 2020-08-31 PROCEDURE — 0651 HSPC ROUTINE HOME CARE

## 2020-09-01 PROCEDURE — 0651 HSPC ROUTINE HOME CARE

## 2020-09-02 PROCEDURE — 0651 HSPC ROUTINE HOME CARE

## 2020-09-03 PROCEDURE — 0651 HSPC ROUTINE HOME CARE

## 2020-09-04 PROCEDURE — 0651 HSPC ROUTINE HOME CARE

## 2020-09-05 PROCEDURE — 0651 HSPC ROUTINE HOME CARE

## 2020-09-06 PROCEDURE — 0651 HSPC ROUTINE HOME CARE

## 2020-09-07 PROCEDURE — 0651 HSPC ROUTINE HOME CARE

## 2020-09-08 PROCEDURE — 0651 HSPC ROUTINE HOME CARE

## 2020-09-09 PROCEDURE — 0651 HSPC ROUTINE HOME CARE

## 2020-09-10 PROCEDURE — 0651 HSPC ROUTINE HOME CARE

## 2020-09-11 PROCEDURE — 0651 HSPC ROUTINE HOME CARE

## 2020-09-12 PROCEDURE — 0651 HSPC ROUTINE HOME CARE

## 2020-09-13 PROCEDURE — 0651 HSPC ROUTINE HOME CARE

## 2020-09-14 PROCEDURE — 0651 HSPC ROUTINE HOME CARE

## 2020-09-15 PROCEDURE — 0651 HSPC ROUTINE HOME CARE

## 2020-09-16 PROCEDURE — 0651 HSPC ROUTINE HOME CARE

## 2020-09-17 PROCEDURE — 0651 HSPC ROUTINE HOME CARE

## 2020-09-18 PROCEDURE — 0651 HSPC ROUTINE HOME CARE

## 2020-09-19 PROCEDURE — 0651 HSPC ROUTINE HOME CARE

## 2020-09-20 PROCEDURE — 0651 HSPC ROUTINE HOME CARE

## 2020-09-21 PROCEDURE — 0651 HSPC ROUTINE HOME CARE

## 2020-09-22 PROCEDURE — 0651 HSPC ROUTINE HOME CARE

## 2020-09-23 PROCEDURE — 0651 HSPC ROUTINE HOME CARE

## 2020-09-24 PROCEDURE — 0651 HSPC ROUTINE HOME CARE

## 2020-09-25 PROCEDURE — 0651 HSPC ROUTINE HOME CARE

## 2020-09-26 PROCEDURE — 0651 HSPC ROUTINE HOME CARE

## 2020-09-27 PROCEDURE — 0651 HSPC ROUTINE HOME CARE

## 2020-09-28 PROCEDURE — 0651 HSPC ROUTINE HOME CARE

## 2020-09-29 PROCEDURE — 0651 HSPC ROUTINE HOME CARE

## 2020-09-30 PROCEDURE — 0651 HSPC ROUTINE HOME CARE

## 2020-10-01 PROCEDURE — 0651 HSPC ROUTINE HOME CARE

## 2020-10-02 PROCEDURE — 0651 HSPC ROUTINE HOME CARE

## 2020-10-03 PROCEDURE — 0651 HSPC ROUTINE HOME CARE

## 2020-10-04 PROCEDURE — 0651 HSPC ROUTINE HOME CARE

## 2020-10-05 PROCEDURE — 0651 HSPC ROUTINE HOME CARE

## 2020-10-06 PROCEDURE — 0651 HSPC ROUTINE HOME CARE

## 2020-10-07 PROCEDURE — 0651 HSPC ROUTINE HOME CARE

## 2020-10-08 PROCEDURE — 0651 HSPC ROUTINE HOME CARE

## 2020-10-09 PROCEDURE — 0651 HSPC ROUTINE HOME CARE

## 2020-10-10 PROCEDURE — 0651 HSPC ROUTINE HOME CARE

## 2020-10-11 PROCEDURE — 0651 HSPC ROUTINE HOME CARE

## 2020-10-12 PROCEDURE — 0651 HSPC ROUTINE HOME CARE

## 2020-10-13 PROCEDURE — 0651 HSPC ROUTINE HOME CARE

## 2020-10-14 PROCEDURE — 0651 HSPC ROUTINE HOME CARE

## 2020-10-15 PROCEDURE — 0651 HSPC ROUTINE HOME CARE

## 2020-10-16 PROCEDURE — 0651 HSPC ROUTINE HOME CARE

## 2020-10-17 PROCEDURE — 0651 HSPC ROUTINE HOME CARE

## 2020-10-18 PROCEDURE — 0651 HSPC ROUTINE HOME CARE

## 2020-10-19 PROCEDURE — 0651 HSPC ROUTINE HOME CARE

## 2020-10-20 PROCEDURE — 0651 HSPC ROUTINE HOME CARE

## 2020-10-21 PROCEDURE — 0651 HSPC ROUTINE HOME CARE

## 2020-10-22 PROCEDURE — 0651 HSPC ROUTINE HOME CARE

## 2020-10-23 PROCEDURE — 0651 HSPC ROUTINE HOME CARE

## 2020-10-24 PROCEDURE — 0651 HSPC ROUTINE HOME CARE

## 2020-10-25 PROCEDURE — 0651 HSPC ROUTINE HOME CARE
